# Patient Record
Sex: FEMALE | Race: OTHER | HISPANIC OR LATINO | URBAN - METROPOLITAN AREA
[De-identification: names, ages, dates, MRNs, and addresses within clinical notes are randomized per-mention and may not be internally consistent; named-entity substitution may affect disease eponyms.]

---

## 2017-12-17 ENCOUNTER — EMERGENCY (EMERGENCY)
Facility: HOSPITAL | Age: 47
LOS: 1 days | Discharge: HOME | End: 2017-12-17
Attending: EMERGENCY MEDICINE | Admitting: EMERGENCY MEDICINE
Payer: COMMERCIAL

## 2017-12-17 VITALS
SYSTOLIC BLOOD PRESSURE: 145 MMHG | HEART RATE: 110 BPM | TEMPERATURE: 98 F | OXYGEN SATURATION: 100 % | WEIGHT: 212.08 LBS | DIASTOLIC BLOOD PRESSURE: 91 MMHG | RESPIRATION RATE: 15 BRPM

## 2017-12-17 VITALS
DIASTOLIC BLOOD PRESSURE: 86 MMHG | HEART RATE: 98 BPM | SYSTOLIC BLOOD PRESSURE: 132 MMHG | RESPIRATION RATE: 17 BRPM | TEMPERATURE: 98 F | OXYGEN SATURATION: 98 %

## 2017-12-17 DIAGNOSIS — Z98.89 OTHER SPECIFIED POSTPROCEDURAL STATES: Chronic | ICD-10-CM

## 2017-12-17 PROCEDURE — 73140 X-RAY EXAM OF FINGER(S): CPT

## 2017-12-17 PROCEDURE — 73140 X-RAY EXAM OF FINGER(S): CPT | Mod: 26,RT

## 2017-12-17 PROCEDURE — 99283 EMERGENCY DEPT VISIT LOW MDM: CPT | Mod: 25

## 2017-12-17 PROCEDURE — 99284 EMERGENCY DEPT VISIT MOD MDM: CPT

## 2017-12-17 NOTE — ED PROVIDER NOTE - OBJECTIVE STATEMENT
Pt is a 47F with a PMH on hypothyroid presenting with a cc of R 4th finger injury this a.m, pt reports playing with small child, when suffered inversion injury to R 4th finger, noted increased swelling, pain, pain w/ movement, bruising, no prior injury to finger before, no bleeding, motor and sensory intact. Denies n/v/f/c/cp/sob. Denies headache, syncope, lightheadedness, dizziness. Denies chest palpitations, abdominal pain. Denies dysuria, hematuria, hematochezia, BRBPR, tarry stools, diarrhea, constipation.

## 2017-12-17 NOTE — ED PROVIDER NOTE - CARE PLAN
Principal Discharge DX:	Fracture  Instructions for follow-up, activity and diet:	Thank you for visiting our Emergency Department, it has been a pleasure taking part in your healthcare.    Your discharge diagnosis is:  R 4th middle phalanx fracture   Please follow up with your PMD within x48 hours.  Please follow up with ortho hand in x48 hours  A list of providers for follow up has been given to you.  Please take all discharge medications as indicated below:  Take Motrin/Tylenol for pain as needed, please follow instructions on manufacturers label. If you have any questions please consult a pharmacist or your PMD.  Return precautions to the Emergency Department include: unrelenting nausea, vomiting, fever, chills, chest pain, shortness of breath, dizziness, abdominal pain, syncope, blood in urine or stool, headache that doesn't resolve, numbness or tingling, loss of sensation, loss of motor function, or any other concerning symptoms. Principal Discharge DX:	Fracture  Goal:	Comminuted fracture right 4 th finger middle phalanx without displacement or angulation  Instructions for follow-up, activity and diet:	Thank you for visiting our Emergency Department, it has been a pleasure taking part in your healthcare.    Your discharge diagnosis is:  R 4th middle phalanx fracture   Please follow up with your PMD within x48 hours.  Please follow up with ortho hand in x48 hours  A list of providers for follow up has been given to you.  Please take all discharge medications as indicated below:  Take Motrin/Tylenol for pain as needed, please follow instructions on manufacturers label. If you have any questions please consult a pharmacist or your PMD.  Return precautions to the Emergency Department include: unrelenting nausea, vomiting, fever, chills, chest pain, shortness of breath, dizziness, abdominal pain, syncope, blood in urine or stool, headache that doesn't resolve, numbness or tingling, loss of sensation, loss of motor function, or any other concerning symptoms.

## 2017-12-17 NOTE — ED ADULT NURSE NOTE - OBJECTIVE STATEMENT
48 y/o female A&Ox4, PMH hypothyroid, presents to ED with c/o right 4th finger pain. Pt states she was playing with her daughter when she went to playfully push her and her finger bent backwards. Pt states "it went up 90 degrees." Pain is rated 8/10, right 4th finger swelling and redness present with minor ecchymosis. As per pt, she has "had broken fingers before." Pt is otherwise well appearing, alert, has no other complaints at this time.

## 2017-12-17 NOTE — ED PROVIDER NOTE - MEDICAL DECISION MAKING DETAILS
47F presents with injury to R 4th finger secondary to inversion injury with child earlier this morning, no prior injury to finger, sensation motor intact, neuro vasc intact, vss, finger appears swollen PIP ttp, limited ROM secondary to pain c/f fx will get xr, pain control Likely d/c w/ pmd f/u, strict return precautions.

## 2017-12-17 NOTE — ED PROVIDER NOTE - ATTENDING CONTRIBUTION TO CARE
I have seen and evaluated this patient with the resident.   I agree with the findings  unless other wise stated.  I have made appropriate changes in documentations where needed, After my face to face bedside evaluation, I am further  notinF presents with injury to R 4th finger secondary to inversion injury with child earlier this morning, no prior injury to finger, sensation motor intact, neuro vasc intact, vss, finger appears swollen PIP ttp, limited ROM secondary to pain c/f fx will get xr, pain control Likely d/c w/ pmd f/u, strict return precautions.

## 2017-12-17 NOTE — ED PROVIDER NOTE - FAMILY HISTORY
Father  Still living? Unknown  Family history of diabetes mellitus (DM), Age at diagnosis: Age Unknown     Mother  Still living? No  Family history of diabetes mellitus (DM), Age at diagnosis: Age Unknown  Family history of heart disease, Age at diagnosis: Age Unknown

## 2017-12-17 NOTE — ED PROVIDER NOTE - PLAN OF CARE
Thank you for visiting our Emergency Department, it has been a pleasure taking part in your healthcare.    Your discharge diagnosis is:  R 4th middle phalanx fracture   Please follow up with your PMD within x48 hours.  Please follow up with ortho hand in x48 hours  A list of providers for follow up has been given to you.  Please take all discharge medications as indicated below:  Take Motrin/Tylenol for pain as needed, please follow instructions on manufacturers label. If you have any questions please consult a pharmacist or your PMD.  Return precautions to the Emergency Department include: unrelenting nausea, vomiting, fever, chills, chest pain, shortness of breath, dizziness, abdominal pain, syncope, blood in urine or stool, headache that doesn't resolve, numbness or tingling, loss of sensation, loss of motor function, or any other concerning symptoms. Comminuted fracture right 4 th finger middle phalanx without displacement or angulation

## 2017-12-22 ENCOUNTER — APPOINTMENT (OUTPATIENT)
Dept: ORTHOPEDIC SURGERY | Facility: CLINIC | Age: 47
End: 2017-12-22
Payer: COMMERCIAL

## 2017-12-22 VITALS
BODY MASS INDEX: 36.56 KG/M2 | RESPIRATION RATE: 18 BRPM | OXYGEN SATURATION: 99 % | HEART RATE: 80 BPM | DIASTOLIC BLOOD PRESSURE: 87 MMHG | SYSTOLIC BLOOD PRESSURE: 133 MMHG | WEIGHT: 213 LBS | TEMPERATURE: 98.5 F

## 2017-12-22 DIAGNOSIS — M25.541 PAIN IN JOINTS OF RIGHT HAND: ICD-10-CM

## 2017-12-22 PROCEDURE — 73140 X-RAY EXAM OF FINGER(S): CPT | Mod: F8

## 2017-12-22 PROCEDURE — 99204 OFFICE O/P NEW MOD 45 MIN: CPT

## 2017-12-22 RX ORDER — DOXYCYCLINE HYCLATE 100 MG/1
100 TABLET ORAL
Qty: 30 | Refills: 0 | Status: ACTIVE | COMMUNITY
Start: 2017-06-24

## 2017-12-27 NOTE — ED PROCEDURE NOTE - NS ED PERI VASCULAR NEG
fingers/toes warm to touch/no cyanosis of extremity/capillary refill time < 2 seconds/no swelling/no paresthesia

## 2017-12-27 NOTE — ED PROCEDURE NOTE - CPROC ED POST PROC CARE GUIDE1
Verbal/written post procedure instructions were given to patient/caregiver./Instructed patient/caregiver to follow-up with primary care physician./Instructed patient/caregiver regarding signs and symptoms of infection./Elevate the injured extremity as instructed.

## 2018-01-12 ENCOUNTER — APPOINTMENT (OUTPATIENT)
Dept: ORTHOPEDIC SURGERY | Facility: CLINIC | Age: 48
End: 2018-01-12

## 2018-01-19 ENCOUNTER — APPOINTMENT (OUTPATIENT)
Dept: ORTHOPEDIC SURGERY | Facility: CLINIC | Age: 48
End: 2018-01-19
Payer: COMMERCIAL

## 2018-01-19 VITALS
TEMPERATURE: 98 F | WEIGHT: 220 LBS | RESPIRATION RATE: 18 BRPM | OXYGEN SATURATION: 97 % | BODY MASS INDEX: 37.76 KG/M2 | HEART RATE: 84 BPM | SYSTOLIC BLOOD PRESSURE: 117 MMHG | DIASTOLIC BLOOD PRESSURE: 77 MMHG

## 2018-01-19 DIAGNOSIS — S62.624A DISPLACED FRACTURE OF MIDDLE PHALANX OF RIGHT RING FINGER, INITIAL ENCOUNTER FOR CLOSED FRACTURE: ICD-10-CM

## 2018-01-19 DIAGNOSIS — M20.011 MALLET FINGER OF RIGHT FINGER(S): ICD-10-CM

## 2018-01-19 PROCEDURE — 26432 REPAIR FINGER TENDON: CPT | Mod: F8

## 2018-01-19 PROCEDURE — 73140 X-RAY EXAM OF FINGER(S): CPT | Mod: F8

## 2018-01-19 PROCEDURE — 99214 OFFICE O/P EST MOD 30 MIN: CPT | Mod: 57

## 2018-01-19 RX ORDER — TRETINOIN 0.25 MG/G
0.03 CREAM TOPICAL
Qty: 20 | Refills: 0 | Status: ACTIVE | COMMUNITY
Start: 2017-12-21

## 2018-01-19 RX ORDER — LEVOTHYROXINE SODIUM 0.03 MG/1
25 TABLET ORAL
Qty: 30 | Refills: 0 | Status: ACTIVE | COMMUNITY
Start: 2017-12-21

## 2018-02-09 ENCOUNTER — APPOINTMENT (OUTPATIENT)
Dept: ORTHOPEDIC SURGERY | Facility: CLINIC | Age: 48
End: 2018-02-09

## 2018-02-26 ENCOUNTER — APPOINTMENT (OUTPATIENT)
Dept: RHEUMATOLOGY | Facility: CLINIC | Age: 48
End: 2018-02-26

## 2018-04-18 ENCOUNTER — EMERGENCY (EMERGENCY)
Facility: HOSPITAL | Age: 48
LOS: 1 days | Discharge: HOME | End: 2018-04-18
Attending: EMERGENCY MEDICINE | Admitting: EMERGENCY MEDICINE
Payer: COMMERCIAL

## 2018-04-18 VITALS
TEMPERATURE: 98 F | DIASTOLIC BLOOD PRESSURE: 68 MMHG | SYSTOLIC BLOOD PRESSURE: 116 MMHG | OXYGEN SATURATION: 100 % | RESPIRATION RATE: 16 BRPM | HEART RATE: 78 BPM

## 2018-04-18 DIAGNOSIS — Z98.89 OTHER SPECIFIED POSTPROCEDURAL STATES: Chronic | ICD-10-CM

## 2018-04-18 PROCEDURE — 99283 EMERGENCY DEPT VISIT LOW MDM: CPT

## 2018-04-18 NOTE — ED PROVIDER NOTE - CONSTITUTIONAL, MLM
normal... Well appearing, well nourished, awake, alert, oriented to person, place, time/situation and in no apparent distress.  Noted periodic cough.  Speaking full sentences comfortably.

## 2018-04-18 NOTE — ED PROVIDER NOTE - MEDICAL DECISION MAKING DETAILS
48 y/o F with URI/viral syndrome.  Discussed discharge instructions and need for hydration and rest.  Offered ibuprofen in ED but does not want.  Understands need for f/u.  Given list of IM docs.  Also given list of ENT docs for f/u if TMJ pain persists.

## 2018-04-18 NOTE — ED PROVIDER NOTE - OBJECTIVE STATEMENT
46 y/o F w/ PMHx of hypothyroidism, presents to the ED c/o cough, congestion, rhinorrhea, HA, body aches and subjective fevers (Tmax 102F) x6 days and right ear pain and sore throat since yesterday. Endorses use of Tylenol w/ no relief. No flu shot this year. Denies vomiting, diarrhea or any other complaints. Allergies: Penicillin. 48 y/o F w/ PMHx of hypothyroidism, presents to the ED c/o cough, nasal congestion, rhinorrhea, HA, body aches/myalgias and fevers (Tmax 102F) x6 days and right ear pain and sore throat since yesterday. Endorses use of Tylenol w/ no relief. No flu shot this year. Denies vomiting, diarrhea or any other complaints. No abd pain.  Tolerating PO.  Allergies: Penicillin.  States does not have PMD but recently obtained health insurance.

## 2018-06-21 ENCOUNTER — APPOINTMENT (OUTPATIENT)
Dept: RHEUMATOLOGY | Facility: CLINIC | Age: 48
End: 2018-06-21
Payer: COMMERCIAL

## 2018-06-21 VITALS
TEMPERATURE: 98.5 F | HEIGHT: 64 IN | HEART RATE: 100 BPM | BODY MASS INDEX: 37.22 KG/M2 | WEIGHT: 218 LBS | OXYGEN SATURATION: 98 % | SYSTOLIC BLOOD PRESSURE: 132 MMHG | DIASTOLIC BLOOD PRESSURE: 91 MMHG

## 2018-06-21 DIAGNOSIS — F41.8 OTHER SPECIFIED ANXIETY DISORDERS: ICD-10-CM

## 2018-06-21 DIAGNOSIS — M79.1 MYALGIA: ICD-10-CM

## 2018-06-21 DIAGNOSIS — R52 PAIN, UNSPECIFIED: ICD-10-CM

## 2018-06-21 LAB
25(OH)D3 SERPL-MCNC: 27 NG/ML
ALBUMIN SERPL ELPH-MCNC: 4.1 G/DL
ALP BLD-CCNC: 54 U/L
ALT SERPL-CCNC: 14 U/L
ANION GAP SERPL CALC-SCNC: 13 MMOL/L
APPEARANCE: CLEAR
APTT BLD: 36.1 SEC
AST SERPL-CCNC: 14 U/L
BACTERIA: NEGATIVE
BASOPHILS # BLD AUTO: 0.02 K/UL
BASOPHILS NFR BLD AUTO: 0.3 %
BILIRUB SERPL-MCNC: 0.3 MG/DL
BILIRUBIN URINE: NEGATIVE
BLOOD URINE: NEGATIVE
BUN SERPL-MCNC: 9 MG/DL
CALCIUM SERPL-MCNC: 9.2 MG/DL
CHLORIDE SERPL-SCNC: 102 MMOL/L
CK SERPL-CCNC: 99 U/L
CO2 SERPL-SCNC: 24 MMOL/L
COLOR: YELLOW
CREAT SERPL-MCNC: 0.81 MG/DL
CRP SERPL-MCNC: 1.2 MG/DL
EOSINOPHIL # BLD AUTO: 0.07 K/UL
EOSINOPHIL NFR BLD AUTO: 1.1 %
ERYTHROCYTE [SEDIMENTATION RATE] IN BLOOD BY WESTERGREN METHOD: 18 MM/HR
FOLATE SERPL-MCNC: >20 NG/ML
GLUCOSE QUALITATIVE U: NEGATIVE MG/DL
GLUCOSE SERPL-MCNC: 98 MG/DL
HCT VFR BLD CALC: 34.4 %
HGB BLD-MCNC: 10.8 G/DL
HYALINE CASTS: 0 /LPF
IMM GRANULOCYTES NFR BLD AUTO: 0.6 %
KETONES URINE: NEGATIVE
LEUKOCYTE ESTERASE URINE: NEGATIVE
LYMPHOCYTES # BLD AUTO: 2.12 K/UL
LYMPHOCYTES NFR BLD AUTO: 33.2 %
MAGNESIUM SERPL-MCNC: 1.9 MG/DL
MAN DIFF?: NORMAL
MCHC RBC-ENTMCNC: 24.1 PG
MCHC RBC-ENTMCNC: 31.4 GM/DL
MCV RBC AUTO: 76.6 FL
MICROSCOPIC-UA: NORMAL
MONOCYTES # BLD AUTO: 0.4 K/UL
MONOCYTES NFR BLD AUTO: 6.3 %
NEUTROPHILS # BLD AUTO: 3.74 K/UL
NEUTROPHILS NFR BLD AUTO: 58.5 %
NITRITE URINE: NEGATIVE
PH URINE: 5
PLATELET # BLD AUTO: 317 K/UL
POTASSIUM SERPL-SCNC: 4.5 MMOL/L
PROT SERPL-MCNC: 7.1 G/DL
PROTEIN URINE: NEGATIVE MG/DL
RBC # BLD: 4.49 M/UL
RBC # FLD: 18.4 %
RED BLOOD CELLS URINE: 2 /HPF
RHEUMATOID FACT SER QL: <7 IU/ML
SODIUM SERPL-SCNC: 139 MMOL/L
SPECIFIC GRAVITY URINE: 1.02
SQUAMOUS EPITHELIAL CELLS: 2 /HPF
TSH SERPL-ACNC: 1.4 UIU/ML
UROBILINOGEN URINE: NEGATIVE MG/DL
VIT B12 SERPL-MCNC: 915 PG/ML
WBC # FLD AUTO: 6.39 K/UL
WHITE BLOOD CELLS URINE: 0 /HPF

## 2018-06-21 PROCEDURE — 99244 OFF/OP CNSLTJ NEW/EST MOD 40: CPT

## 2018-06-21 RX ORDER — TIZANIDINE 2 MG/1
2 TABLET ORAL
Qty: 60 | Refills: 1 | Status: ACTIVE | COMMUNITY
Start: 2018-06-21 | End: 1900-01-01

## 2018-06-22 LAB
CENTROMERE IGG SER-ACNC: <0.2 AL
CREAT SPEC-SCNC: 182 MG/DL
CREAT/PROT UR: 0.1 RATIO
DSDNA AB SER-ACNC: <12 IU/ML
ENA RNP AB SER IA-ACNC: <0.2 AL
ENA SCL70 IGG SER IA-ACNC: <0.2 AL
ENA SM AB SER IA-ACNC: <0.2 AL
ENA SS-A AB SER IA-ACNC: <0.2 AL
ENA SS-B AB SER IA-ACNC: <0.2 AL
PROT UR-MCNC: 10 MG/DL
THYROGLOB AB SERPL-ACNC: <20 IU/ML
THYROPEROXIDASE AB SERPL IA-ACNC: 11.2 IU/ML

## 2018-06-25 LAB
ANA PAT FLD IF-IMP: ABNORMAL
ANA SER IF-ACNC: ABNORMAL
B2 GLYCOPROT1 AB SER QL: NEGATIVE
CARDIOLIPIN AB SER IA-ACNC: NEGATIVE
CCP AB SER IA-ACNC: <8 UNITS
RF+CCP IGG SER-IMP: NEGATIVE
RNA POLYMERASE III IGG: <10 U

## 2018-08-02 ENCOUNTER — APPOINTMENT (OUTPATIENT)
Dept: RHEUMATOLOGY | Facility: CLINIC | Age: 48
End: 2018-08-02

## 2018-08-10 ENCOUNTER — APPOINTMENT (OUTPATIENT)
Dept: RHEUMATOLOGY | Facility: CLINIC | Age: 48
End: 2018-08-10

## 2018-10-08 ENCOUNTER — APPOINTMENT (OUTPATIENT)
Dept: RHEUMATOLOGY | Facility: CLINIC | Age: 48
End: 2018-10-08
Payer: COMMERCIAL

## 2018-10-08 VITALS — TEMPERATURE: 98.4 F

## 2018-10-08 VITALS
DIASTOLIC BLOOD PRESSURE: 70 MMHG | HEIGHT: 64 IN | WEIGHT: 213 LBS | HEART RATE: 80 BPM | SYSTOLIC BLOOD PRESSURE: 105 MMHG | BODY MASS INDEX: 36.37 KG/M2 | OXYGEN SATURATION: 97 %

## 2018-10-08 DIAGNOSIS — M77.11 LATERAL EPICONDYLITIS, RIGHT ELBOW: ICD-10-CM

## 2018-10-08 DIAGNOSIS — M79.7 FIBROMYALGIA: ICD-10-CM

## 2018-10-08 DIAGNOSIS — R53.83 OTHER FATIGUE: ICD-10-CM

## 2018-10-08 DIAGNOSIS — M25.50 PAIN IN UNSPECIFIED JOINT: ICD-10-CM

## 2018-10-08 PROCEDURE — 99214 OFFICE O/P EST MOD 30 MIN: CPT

## 2018-10-08 RX ORDER — DULOXETINE HYDROCHLORIDE 30 MG/1
30 CAPSULE, DELAYED RELEASE PELLETS ORAL
Qty: 30 | Refills: 1 | Status: ACTIVE | COMMUNITY
Start: 2018-10-08 | End: 1900-01-01

## 2018-11-29 ENCOUNTER — APPOINTMENT (OUTPATIENT)
Dept: RHEUMATOLOGY | Facility: CLINIC | Age: 48
End: 2018-11-29

## 2019-12-21 ENCOUNTER — EMERGENCY (EMERGENCY)
Facility: HOSPITAL | Age: 49
LOS: 1 days | Discharge: HOME | End: 2019-12-21
Admitting: EMERGENCY MEDICINE
Payer: COMMERCIAL

## 2019-12-21 VITALS
SYSTOLIC BLOOD PRESSURE: 139 MMHG | TEMPERATURE: 99 F | RESPIRATION RATE: 16 BRPM | HEART RATE: 115 BPM | OXYGEN SATURATION: 99 % | DIASTOLIC BLOOD PRESSURE: 93 MMHG

## 2019-12-21 DIAGNOSIS — Z98.89 OTHER SPECIFIED POSTPROCEDURAL STATES: Chronic | ICD-10-CM

## 2019-12-21 PROCEDURE — 99284 EMERGENCY DEPT VISIT MOD MDM: CPT | Mod: 25

## 2019-12-21 PROCEDURE — 12011 RPR F/E/E/N/L/M 2.5 CM/<: CPT | Mod: LT

## 2019-12-21 PROCEDURE — 12002 RPR S/N/AX/GEN/TRNK2.6-7.5CM: CPT | Mod: LT

## 2019-12-21 RX ORDER — IBUPROFEN 200 MG
600 TABLET ORAL ONCE
Refills: 0 | Status: COMPLETED | OUTPATIENT
Start: 2019-12-21 | End: 2019-12-21

## 2019-12-21 RX ORDER — OXYCODONE AND ACETAMINOPHEN 5; 325 MG/1; MG/1
1 TABLET ORAL ONCE
Refills: 0 | Status: DISCONTINUED | OUTPATIENT
Start: 2019-12-21 | End: 2019-12-21

## 2019-12-21 RX ADMIN — OXYCODONE AND ACETAMINOPHEN 1 TABLET(S): 5; 325 TABLET ORAL at 23:18

## 2019-12-21 RX ADMIN — Medication 600 MILLIGRAM(S): at 23:18

## 2019-12-21 NOTE — ED PROVIDER NOTE - NSFOLLOWUPINSTRUCTIONS_ED_ALL_ED_FT
Return to the ED for suture removal of face in 3-5 days, apply bacitracin twice daily to the affected area.    Return to the ED in 10-14 days for removal of sutures to the left lower leg, apply bacitracin to the affected area and change dressings daily.    Take antibiotics as prescribed, return to the ED if any pus, redness, warmth, discharge from wounds or fevers/chills as this is sign of infection that may require IV antibiotics.     Advance activity as tolerated.  Continue all previously prescribed medications as directed unless otherwise instructed.  Follow up with your primary care physician in 48-72 hours- bring copies of your results.  Return to the ER for worsening or persistent symptoms, and/or ANY NEW OR CONCERNING SYMPTOMS. If you have issues obtaining follow up, please call: [phone] to obtain a doctor or specialist who takes your insurance in your area.  You may call [phone] to make an appointment with the internal medicine clinic.

## 2019-12-21 NOTE — ED PROVIDER NOTE - PROGRESS NOTE DETAILS
Lacerations repaired at bedside by me, pending CT max face, if negative will dispo. Patient left the ED prior to receiving d/c papers, did not want to stay any longer. I discussed follow up care for wounds at bedside at length while I was repairing the wounds.  and patient verbalized understanding of follow up instructions.

## 2019-12-21 NOTE — ED PROVIDER NOTE - OBJECTIVE STATEMENT
48 y/o female with PMHx of hypothyroidism presents to the ED s/p dog bite this evening. Pt was bit on her L shin by her pitbul as result of bite pt stood up quickly and tripped striking the L cheek/periorbital area on coffee table. Pt is not on any anticoagulants. No LOC, headache, blurry vision, numbness, weakness, or SOB. Pt reports L sided facial pain and L sided shin pain. Pt's tetanus UTD due to dog bite 3 months ago. Vaccines of dog also UTD. NKDA. No other acute complaints at time of eval. 48 y/o female with PMHx of hypothyroidism presents to the ED s/p dog bite this evening. Pt was bit on her L shin by her pitbul as result of bite pt stood up quickly and tripped striking the L cheek/periorbital area on coffee table. Pt is not on any anticoagulants. No LOC, headache, blurry vision, numbness, weakness, or SOB. Pt reports L sided facial pain and L sided shin pain. Pt's tetanus UTD due to dog bite 3 months ago. Vaccines of dog also UTD. Pt states she has been bit by the dog 3 times in the last few months. Requesting suture removal from a prior dog bite, had sutures placed at Urgent Care, supposed to take out at 9 days, today is day 10.

## 2019-12-21 NOTE — ED ADULT TRIAGE NOTE - CHIEF COMPLAINT QUOTE
alert states she was bitten by a her dog  on right hand and left lower leg  covered with dsg  after being bitten patient fell and hit left side of face on a table and has swelling below left eye  c/o head ache  no dizziness or blurry vision  no blood thinner hx hypothyroid

## 2019-12-21 NOTE — ED PROVIDER NOTE - CARE PLAN
Principal Discharge DX:	Dog bite  Assessment and plan of treatment:	Advance activity as tolerated.  Continue all previously prescribed medications as directed unless otherwise instructed.  Follow up with your primary care physician in 48-72 hours- bring copies of your results.  Return to the ER for worsening or persistent symptoms, and/or ANY NEW OR CONCERNING SYMPTOMS. If you have issues obtaining follow up, please call: [phone] to obtain a doctor or specialist who takes your insurance in your area.  You may call [phone] to make an appointment with the internal medicine clinic.  Secondary Diagnosis:	Laceration of leg  Secondary Diagnosis:	Contusion of face  Secondary Diagnosis:	Black eye  Secondary Diagnosis:	Encounter for removal of sutures

## 2019-12-21 NOTE — ED PROVIDER NOTE - MUSCULOSKELETAL, MLM
Spine appears normal, range of motion is not limited, no muscle or joint tenderness Spine appears normal, range of motion is not limited, no muscle or joint tenderness- FAROM of L foot/knee, 5/5 strength, no sensory deficits.

## 2019-12-21 NOTE — ED PROVIDER NOTE - PHYSICAL EXAMINATION
Skin: +L periorbital ecchymosis. +laceration to the skin of infraorbital ridge with significant swelling and ecchymosis. +3 cm complex laceration/puncture wound and smaller puncture wound to the L shin consistent with dog bites. Skin: +L periorbital ecchymosis. +laceration to the skin overlying infraorbital ridge with significant swelling and ecchymosis. +3 cm complex laceration/puncture wound to anterior shin, additional 1cm laceration/puncture wound to the L shin and other skin avulsions/multiple abrasions consistent with dog bite.

## 2019-12-21 NOTE — ED PROVIDER NOTE - CLINICAL SUMMARY MEDICAL DECISION MAKING FREE TEXT BOX
50 y/o female with facial trauma s/p dog bite secondary to mechanical fall. Plan to CT max/face to r/o facial fracture. Will repair laceration at bedside. Will give antibiotics and tetanus. 50 y/o female with facial trauma secondary to mechanical fall s/p dog bite. Plan to CT max/face to r/o facial fracture. Will repair laceration at bedside. Abx, pain control, tetanus UTD

## 2019-12-21 NOTE — ED PROVIDER NOTE - PLAN OF CARE
Advance activity as tolerated.  Continue all previously prescribed medications as directed unless otherwise instructed.  Follow up with your primary care physician in 48-72 hours- bring copies of your results.  Return to the ER for worsening or persistent symptoms, and/or ANY NEW OR CONCERNING SYMPTOMS. If you have issues obtaining follow up, please call: [phone] to obtain a doctor or specialist who takes your insurance in your area.  You may call [phone] to make an appointment with the internal medicine clinic.

## 2019-12-21 NOTE — ED PROVIDER NOTE - PATIENT PORTAL LINK FT
You can access the FollowMyHealth Patient Portal offered by [facility] by registering at the following website: [email or web address]. By joining [employer]’s FollowMyHealth portal, you will also be able to view your health information using other applications (apps) compatible with our system.

## 2019-12-22 VITALS
DIASTOLIC BLOOD PRESSURE: 63 MMHG | RESPIRATION RATE: 16 BRPM | OXYGEN SATURATION: 100 % | HEART RATE: 80 BPM | SYSTOLIC BLOOD PRESSURE: 113 MMHG

## 2019-12-22 PROCEDURE — 70486 CT MAXILLOFACIAL W/O DYE: CPT | Mod: 26

## 2019-12-22 RX ADMIN — Medication 100 MILLIGRAM(S): at 01:03

## 2019-12-22 NOTE — ED PROCEDURE NOTE - LACERATION CAUSED BY
Dog bite to lower left leg, additional 1cm also from dog bite. L cheek 1cm laceration s/p blunt facial trauma

## 2021-05-10 NOTE — ED ADULT TRIAGE NOTE - NS ED TRIAGE AVPU SCALE
Procedure Note      Patient Name: Eileen Kaufman   Patient ID: [id number]   Sex: Female   YOB: 1950    Primary Care Provider: Malaika JARA   Referring Provider: Malaika JARA    Visit Date: May 20, 2020    Provider: Simon Mao MD   Location: [facility]   Location Address: [address] [place] [address]  [address] [place] [address]  [id number]   Location Phone: [phone]          FINAL REPORT   HOLTER MONITOR REPORT  Date: 05/20/2020   Indication: Palpitations      The patient was monitored for a period of 23 hours and 55 minutes starting from 10:55 AM on 05/20/2020.   The underlying rhythm was normal sinus rhythm with heart rates varying from 61 beats per minute to 114 beats per minute, average being 86 beats per minute. The fastest heart rate of 114 beats per minute was documented at 19:22 in sinus tachycardia.  Infrequent (483 in total) isolated ventricular ectopic beats noted. Rare supraventricular ectopic beats noted.  There were no significant pauses.  The patient did not report any symptoms during the study period.      CONCLUSION:  24-hour Holter monitor study showing infrequent isolated ventricular ectopic beats.  No sustained arrhythmias noted.         MD DANTE Méndez/dung    This note was transcribed by Malinda Rothman.  dung/dante  The above service was transcribed by Malinda Rothman, and I attest to the accuracy of the note.  DANTE                 Electronically Signed by: Preeti Rothman-, Other -Author on June 1, 2020 08:51:19 AM  Electronically Co-signed by: Simon Mao MD -Reviewer on June 2, 2020 01:04:12 PM  
Alert-The patient is alert, awake and responds to voice. The patient is oriented to time, place, and person. The triage nurse is able to obtain subjective information.

## 2022-05-07 ENCOUNTER — EMERGENCY (EMERGENCY)
Facility: HOSPITAL | Age: 52
LOS: 1 days | Discharge: HOME | End: 2022-05-07
Attending: STUDENT IN AN ORGANIZED HEALTH CARE EDUCATION/TRAINING PROGRAM | Admitting: STUDENT IN AN ORGANIZED HEALTH CARE EDUCATION/TRAINING PROGRAM
Payer: COMMERCIAL

## 2022-05-07 VITALS
DIASTOLIC BLOOD PRESSURE: 86 MMHG | HEIGHT: 65 IN | OXYGEN SATURATION: 99 % | RESPIRATION RATE: 18 BRPM | SYSTOLIC BLOOD PRESSURE: 146 MMHG | HEART RATE: 114 BPM | TEMPERATURE: 98 F

## 2022-05-07 VITALS
HEART RATE: 89 BPM | OXYGEN SATURATION: 97 % | DIASTOLIC BLOOD PRESSURE: 71 MMHG | TEMPERATURE: 99 F | SYSTOLIC BLOOD PRESSURE: 104 MMHG | RESPIRATION RATE: 18 BRPM

## 2022-05-07 DIAGNOSIS — Z98.89 OTHER SPECIFIED POSTPROCEDURAL STATES: Chronic | ICD-10-CM

## 2022-05-07 PROCEDURE — 73110 X-RAY EXAM OF WRIST: CPT | Mod: 26,RT

## 2022-05-07 PROCEDURE — 73090 X-RAY EXAM OF FOREARM: CPT | Mod: 26,RT

## 2022-05-07 PROCEDURE — 73080 X-RAY EXAM OF ELBOW: CPT | Mod: 26,RT

## 2022-05-07 PROCEDURE — 99284 EMERGENCY DEPT VISIT MOD MDM: CPT

## 2022-05-07 PROCEDURE — 73130 X-RAY EXAM OF HAND: CPT | Mod: 26,50

## 2022-05-07 RX ORDER — IBUPROFEN 200 MG
600 TABLET ORAL ONCE
Refills: 0 | Status: COMPLETED | OUTPATIENT
Start: 2022-05-07 | End: 2022-05-07

## 2022-05-07 RX ORDER — ACETAMINOPHEN 500 MG
650 TABLET ORAL ONCE
Refills: 0 | Status: COMPLETED | OUTPATIENT
Start: 2022-05-07 | End: 2022-05-07

## 2022-05-07 RX ADMIN — Medication 650 MILLIGRAM(S): at 23:42

## 2022-05-07 RX ADMIN — Medication 600 MILLIGRAM(S): at 23:42

## 2022-05-07 NOTE — ED PROVIDER NOTE - PATIENT PORTAL LINK FT
You can access the FollowMyHealth Patient Portal offered by [facility] by registering at the following website: [email or web address]. By joining [employer]’s FollowMyHealth portal, you will also be able to view your health information using other applications (apps) compatible with our system.

## 2022-05-07 NOTE — ED PROVIDER NOTE - CLINICAL SUMMARY MEDICAL DECISION MAKING FREE TEXT BOX
Pt is a 52 y/o F no pertinent PMHx p/w right wrist pain today. -- r/o fracture; musculoskeletal pain -- xray bilateral hands, xray right wrist/forearm/elbow; pain control

## 2022-05-07 NOTE — ED ADULT TRIAGE NOTE - CHIEF COMPLAINT QUOTE
Pt arrives crying Pt  st" I just tripped and fell  both my hands and my rt wrist hurts really bad." +swelling to rt wrist. Denies medical hx

## 2022-05-07 NOTE — ED PROVIDER NOTE - ATTENDING APP SHARED VISIT CONTRIBUTION OF CARE
I have personally performed a face to face medical and diagnostic evaluation of the patient. I have discussed with and reviewed the OMEGA's note and agree with the History, ROS, Physical Exam and MDM unless otherwise indicated. A brief summary of my personal evaluation and impression can be found below.    51F no pmh presents with a cc FOOSH injury to R>L wrists after mechanical non syncopal trip down a few stairs earlier this evening, no head strike no LOC recalls entire event. Denies numbness, tingling or loss of sensation. Denies loss of motor function. Denies changes in vision. No AC. notes mild discomfort to L hip area but has been able to ambulate since. Denies n/v/f/c/cp/sob. Denies headache, syncope, lightheadedness, dizziness. Denies chest palpitations, abdominal pain. Denies edema. Denies dysuria, hematuria, BRBPR, tarry stools, diarrhea, constipation. Notes pain from R elbow to R fingers and L wrist to L fingers diffusely.     All other ROS negative, except as above and as per HPI and ROS section.    VITALS: Initial triage and subsequent vitals have been reviewed by me.  GEN APPEARANCE: WDWN, alert, non-toxic, NAD  HEAD: Atraumatic.  EYES: PERRLa, EOMI, vision grossly intact.   NECK: Supple  CV: RRR, S1S2, no c/r/m/g. Cap refill <2 seconds. No bruits.   LUNGS: CTAB. No abnormal breath sounds.  ABDOMEN: Soft, NTND. No guarding or rebound.   MSK/EXT: mild R elbow to R finger ttp, L wrist to L fingers ttp  RUE swelling to R wrist mild diffuse ttp distally, no snuffbox ttp b/l No CVA ttp. Pelvis stable. No peripheral edema.  NEURO: Alert, follows commands. Weight bearing normal. Speech normal. Sensation and motor normal x4 extremities.   SKIN: Warm, dry and intact. No rash.  PSYCH: Appropriate    Plan/MDM:   51F no pmh presents with a cc FOOSH injury to R>L wrists after mechanical non syncopal trip down a few stairs earlier this evening, no head strike no LOC recalls entire event. Denies numbness, tingling or loss of sensation. Denies loss of motor function. Denies changes in vision. No AC. notes mild discomfort to L hip area but has been able to ambulate since. exam vss non toxic PE as above ddx c/f fx will get xr eval for fx meds as needed and reassess.

## 2022-05-07 NOTE — ED PROVIDER NOTE - PROGRESS NOTE DETAILS
PA DAS:  Xray negative for fracture.  ACE bandage applied.  Pt medically stable for discharge.  Strict return precautions given.  Pt to follow up with PMD.

## 2022-05-07 NOTE — ED PROVIDER NOTE - NSFOLLOWUPINSTRUCTIONS_ED_ALL_ED_FT
Advance activity as tolerated.  Continue all previously prescribed medications as directed unless otherwise instructed.  Take Tylenol 650mg (Two 325 mg pills) every 4-6 hours as needed for pain or fevers. Take Motrin (also sold as Advil or Ibuprofen) 400-600 mg (two or three 200 mg over the counter pills) every 8 hours as needed for moderate pain or fevers-- take with food.  Keep extremity elevated and wrapped.  Apply cool compresses to affected area for 15 minutes, 3-4 times per day.  Follow up with your primary care physician in 48-72 hours- bring copies of your results.  Return to the ER for worsening or persistent symptoms, including but not limited to worsening/persistent pain, numbness, weakness, and/or ANY NEW OR CONCERNING SYMPTOMS. If you have issues obtaining follow up, please call: [phone] to obtain a doctor or specialist who takes your insurance in your area.  You may call [phone] to make an appointment with the internal medicine clinic.

## 2022-05-07 NOTE — ED PROVIDER NOTE - LOCATION
no point bony tenderness; no redness; no warmth; no swelling; no crepitus; + SOFT COMPARTMENTS; no snuff box tenderness; 5/5 strength; sensation intact to light touch; < 2 sec capillary refill; 2+ pulses wrist

## 2022-05-07 NOTE — ED PROVIDER NOTE - NS ED ATTENDING STATEMENT MOD
This was a shared visit with the OMEGA. I reviewed and verified the documentation and independently performed the documented:

## 2022-05-07 NOTE — ED PROVIDER NOTE - OBJECTIVE STATEMENT
Pt is a 52 y/o F no pertinent PMHx p/w right wrist pain today.  Pt reports she tripped down two steps, landing onto both hands w/o head trauma or LOC.  Pt reports aching pain to all of her fingers at bilateral hands and right wrist.  Pt denies any fevers, chills, numbness, weakness, headache, neck pain, back pain, chest pain, abdominal pain, use of blood thinners or any other specific complaints.

## 2022-05-07 NOTE — ED PROVIDER NOTE - UPPER EXTREMITY EXAM, RIGHT
+distal radial tenderness; no redness; no warmth; no crepitus; soft compartments; 2+ pulses; < 2 sec capillary refill; 5/5 strength; sensation intact to light touch; NO SNUFFBOX TENDERNESS

## 2022-07-16 NOTE — ED PROVIDER NOTE - SCRIBE NAME
[Patient Optimized for Surgery] : Patient optimized for surgery [No Further Testing Recommended] : no further testing recommended [As per surgery] : as per surgery [FreeTextEntry4] : Physical Exam:\par Constitutional: No acute distress, well appearing\par HEENT: Normocephalic, atraumatic\par Neck: supple\par Cardiac: S1S2, Regular rate and rhythm, No murmurs\par Pulmonary: No respiratory distress, Lungs clear to auscultation bilaterally, no wheezing, rales, or rhonchi\par Abdomen: Soft, non-tender, non-distended, no guarding, normal bowel sounds\par Vascular: No peripheral edema\par Neurology: Coordination grossly intact, no focal deficits\par Psychiatric: Alert and oriented x3, normal mood\par \par \par \par A/P:\par HTN: \par BP controlled\par - c/w lisinopril hctz 10-12.5\par - rec to hold morning dose on the day of surgery or as directed by surgeon\par \par ADHD/  mood disorder:\par - can c/w meds as per psych\par \par pts bloodwork and urine and EKG from presurgical testing was also reviewed. pt is medically cleared for upcoming surgery.  Cody Sutherland

## 2022-12-19 NOTE — ED ADULT TRIAGE NOTE - AS O2 DELIVERY
Group Psychotherapy (PhD/LCSW)    Site: [facility] ([place], [place])    Clinical status of patient: Intensive Outpatient Program (IOP)    Date: 12/19/22    Group Focus: Intro to Emotions    Length of service: 37354 - 45-50 minutes    Number of patients in attendance: 9    Referred by: [facility]    Target symptoms: Depression, anxiety    Patient's response to treatment: Active Listening and Self-disclosure    Progress toward goals: Progressing adequately    Interval History:  Session focus was introduction to emotions and the three-component model of emotions. Patients were educated on the purpose of emotions, what each common emotion alerts us to, and that each emotion exists on its own separate continuum. The three components of emotions (behavior, thought, physical sensations) and skills used to address each component were reviewed.    Diagnosis:     ICD-10-CM ICD-9-CM   1. Mood disorder  F39 296.90   2. Anxiety disorder, unspecified type  F41.9 300.00         Plan: Continue treatment on [facility]   room air